# Patient Record
Sex: MALE | HISPANIC OR LATINO | URBAN - NONMETROPOLITAN AREA
[De-identification: names, ages, dates, MRNs, and addresses within clinical notes are randomized per-mention and may not be internally consistent; named-entity substitution may affect disease eponyms.]

---

## 2018-01-18 ENCOUNTER — OFFICE VISIT CONVERTED (OUTPATIENT)
Dept: FAMILY MEDICINE CLINIC | Age: 23
End: 2018-01-18
Attending: NURSE PRACTITIONER

## 2018-04-26 ENCOUNTER — OFFICE VISIT CONVERTED (OUTPATIENT)
Dept: FAMILY MEDICINE CLINIC | Age: 23
End: 2018-04-26
Attending: NURSE PRACTITIONER

## 2021-05-18 NOTE — PROGRESS NOTES
João Haney 1995     Office/Outpatient Visit    Visit Date: Thu, Jan 18, 2018 11:04 am    Provider: Lillian Nielsen N.P. (Assistant: Rachna Tsai RN)    Location: [facility]        Electronically signed by Lillian Nielsen N.P. on  01/18/2018 02:30:54 PM                             SUBJECTIVE:        CC:     Mr. Haney is a 22 year old White male.  This is his first visit to the clinic.  PE PT IS NOT TAKING PROZAC         HPI:         High risk sexual behavior: Pt states his girlfriend cheated on him. She told him she was pos for trich and was treated. Pt would like to be tested for all STD's. He is no longer with this woman.          Conjunctivitis: Pt states L eye redness since last night. He used an rx allergy drop without much relief. He is a  but wears a shield. He does recall getting a piece of metal in his eye but he is sure he got it out.          With regard to the health checkup, he cannot recall when he last had a physical exam.  He does not perform regular testicular self-exams.      Smoking Status:  Nonsmoker         In regard to the low back pain, other details: Pt states back injury x 1 month ago at work. He was given muscle relaxers which helped. States recently in the last few days he has had a flare.  Pain starts at his low back, L side and radiates down his L leg..          Dysthymic disorder details; pt states emotional changes. He does drink alcohol to help deal with his issues at night. States not thinking that is much of a problem. He has been dealing with relationship issus with his girlfriend and his reason for coming in today. States taking zoloft in the past and did not like it.      ROS:     CONSTITUTIONAL:  Negative for chills, fatigue, fever and weight change.      CARDIOVASCULAR:  Negative for chest pain, orthopnea, paroxysmal nocturnal dyspnea and pedal edema.      RESPIRATORY:  Negative for dyspnea and cough.      GASTROINTESTINAL:  Negative for  abdominal pain, heartburn, constipation, diarrhea, and stool changes.      MUSCULOSKELETAL:  Negative for arthralgias, back pain, and myalgias.      NEUROLOGICAL:  Negative for dizziness, headaches, paresthesias, and weakness.      PSYCHIATRIC:  Positive for depression and sadness.   Negative for anxiety or suicidal thoughts.          PMH/FMH/SH:     Last Reviewed on 1/18/2018 11:57 AM by Lillian Nielsen    Past Medical History: FRACTURE RIGHT FINGERS (HIT WALL) 7-15-11  SEEING ORTHOPEDIST IN [place]     UNREMARKABLE         Surgical History:     NONE     Appendectomy: 2014; uncomplicated;         Family History:     Father: Hypertension     Mother: Breast Cancer     Brother(s): Healthy; 3 brother(s) total     Sister(s): Healthy; 1 sister(s) total     Son(s): 0 son(s) total     Daughter(s): 0 daughter(s) total     Paternal Grandfather: Type 2 Diabetes     Paternal Grandmother: Type 2 Diabetes     Maternal Grandfather: Healthy     Maternal Grandmother: Healthy         Social History:         Household:  Lives with his father.      No exposure to tobacco smoke.  Occupation: [employer];     Marital Status: Single     Children: None     Hobbies/Recreation: he enjoys none;     Mr. Haney denies any current form of exercise.          Tobacco/Alcohol/Supplements:     Last Reviewed on 1/18/2018 11:57 AM by Lillian Nielsen    Tobacco: He has never smoked.          Alcohol: Frequency: Daily When he drinks, the average quantity of alcohol is.   He typically consumes beer and hard liquor.          Substance Abuse History:     Last Reviewed on 1/18/2018 11:57 AM by Lillian Nielsen        Mental Health History:     Last Reviewed on 1/18/2018 11:57 AM by Lillian Nielsen        Communicable Diseases (eg STDs):     Last Reviewed on 1/18/2018 11:57 AM by Lillian Nielsen            Current Problems:     Last Reviewed on 1/18/2018 11:57 AM by Lillian Nielsen    High risk sexual behavior     Moderate depression      Anxiety, generalized     Esophageal reflux     Family disruption due to divorce or legal separation     Acute conjunctivitis, NOS         Immunizations:     DTaP 1996     DTaP 2/7/1997     DTaP 4/10/1997     DTaP 8/20/2002     Hib HbOC (4-dose) 1996     Hib HbOC (4-dose) 2/7/1997     Hib HbOC (4-dose) 4/10/1997     Hep B (pedi/adol, 3-dose schedule) 1996     Hep B (pedi/adol, 3-dose schedule) 1996     Hep B (pedi/adol, 3-dose schedule) 9/10/1997     Menveo 8/3/2011     IPV  Poliovirus, inactivated 1996     IPV  Poliovirus, inactivated 2/7/1997     IPV  Poliovirus, inactivated 10/8/1997     IPV  Poliovirus, inactivated 8/20/2002     MMR  (Measles-Mumps-Rubella), live 10/8/1997     MMR  (Measles-Mumps-Rubella), live 8/20/2002     Varicella, live 8/20/2002     Varicella, live 8/3/2011     Adacel (Tdap) 8/3/2011         Allergies:     Last Reviewed on 1/18/2018 11:57 AM by Lillian Nielsen      No Known Drug Allergies.         Current Medications:     Last Reviewed on 1/18/2018 11:57 AM by Lillian Nielsen    None        OBJECTIVE:        Vitals:         Current: 1/18/2018 11:07:52 AM    Ht:  5 ft, 10 in;  Wt: 226.8 lbs;  BMI: 32.5    T: 98.1 F (oral);  BP: 129/85 mm Hg (left arm, sitting);  P: 72 bpm (left arm (BP Cuff), sitting);  sCr: 0.9 mg/dL;  GFR: 137.46        Exams:     PHYSICAL EXAM:     GENERAL: Vitals recorded well developed, well nourished;  well groomed;  no apparent distress;     EYES: lids and lacrimal system are normal in appearance; extraocular movements intact; Bilateral erythema;  PERRLA;     NECK:  supple, full ROM; no thyromegaly; no carotid bruits;     RESPIRATORY: normal respiratory rate and pattern with no distress; normal breath sounds with no rales, rhonchi, wheezes or rubs;     CARDIOVASCULAR: normal rate; rhythm is regular;  normal S1; normal S2; no systolic murmur; no cyanosis; no edema;     GASTROINTESTINAL: nontender, nondistended; no hepatosplenomegaly or  masses; no bruits;     SKIN:  no significant rashes or lesions; no suspicious moles;     MUSCULOSKELETAL: Low back tenderness, SLR neg bilaterally;     NEUROLOGICAL:  cranial nerves, motor and sensory function, reflexes, gait and coordination are all intact;     PSYCHIATRIC:  appropriate affect and demeanor; normal speech pattern; grossly normal memory;         ASSESSMENT:           V69.2   Z72.51  High risk sexual behavior              DDx:     372.00   H10.33  Acute conjunctivitis, NOS              DDx:     V70.0   Z00.00  Health checkup              DDx:     724.2   M54.5  Low back pain              DDx:     300.4   F34.89  Dysthymic disorder              DDx:     V79.0   Z13.89  Screening for depression              DDx:         ORDERS:         Meds Prescribed:       Polytrim (Polymyxin B/Trimethoprim ) Ophthalmic Solution 1-2 drops to affected eye every 3 hours x 7 days.  #1 (One) bottle Refills: 0       Cyclobenzaprine HCl 10mg Tablet 1 tablet AT HS PRN  #20 (Twenty) tablet(s) Refills: 0       Medrol (Methylprednisolone) 4mg Dosepak Take as directed with food  #1 (One) dose pack Refills: 0       Lexapro (Escitalopram Oxalate) 10mg Tablet 1 tab daily  #30 (Thirty) tablet(s) Refills: 2         Lab Orders:       [id number]  AHEP4 - [facility] Hepatitis Panel (HAAb, HbcAB, HbsAG, Hep C antibody)  (Send-Out)         88728  HIV - [facility] HIV-1 and HIV-2 Ab  (Send-Out)         33684  RPR - H RPR, Rfx Qn RPR/Confirm TP  (Send-Out)         74776  CTNGX- [facility] Chlamydia GC swab or urine (23758, 12239)  (Send-Out)         36613  HSVIG -[facility]- Herpes simplex IgG1&@ 15623 and 43810  (Send-Out)         38841  BDUTR - [facility] Urine Trichonomas  (Send-Out)         97927  BDUAM - [facility] Urinalysis, automated, with micro  (Send-Out)           Other Orders:       [id number]  Behav assmt w/score & docd/stand instrument  (In-House)                   PLAN:          High risk sexual behavior     LABORATORY:  Labs ordered to be performed today include STD  Testing: Hepatitis panel HIV I and HIV 2 Ab RPR Chlamydia/GC [facility] Herpes type I and II IgG index value on each type and urinalysis with micro.  School/Work Excuse for Today           Orders:       [id number]  AHEP4 - [facility] Hepatitis Panel (HAAb, HbcAB, HbsAG, Hep C antibody)  (Send-Out)         29780  HIV - [facility] HIV-1 and HIV-2 Ab  (Send-Out)         53262  RPR - H RPR, Rfx Qn RPR/Confirm TP  (Send-Out)         97593  CTNGX- [facility] Chlamydia GC swab or urine (81182, 37796)  (Send-Out)         81150  HSVIG -[facility]- Herpes simplex IgG1&@ 91265 and 63620  (Send-Out)         98150  BDUTR - [facility] Urine Trichonomas  (Send-Out)         69681  BDUAM - [facility] Urinalysis, automated, with micro  (Send-Out)            Acute conjunctivitis, NOS Pt agrees to see an optometrist tomorrow if no improvement.         FOLLOW-UP: Advised to call if there is no improvement..   Chronic visit follow up           Prescriptions:       Polytrim (Polymyxin B/Trimethoprim ) Ophthalmic Solution 1-2 drops to affected eye every 3 hours x 7 days.  #1 (One) bottle Refills: 0          Low back pain           Prescriptions:       Cyclobenzaprine HCl 10mg Tablet 1 tablet AT HS PRN  #20 (Twenty) tablet(s) Refills: 0       Medrol (Methylprednisolone) 4mg Dosepak Take as directed with food  #1 (One) dose pack Refills: 0          Dysthymic disorder         FOLLOW-UP: Schedule a follow-up visit in 1 month..   Chronic visit follow up           Prescriptions:       Lexapro (Escitalopram Oxalate) 10mg Tablet 1 tab daily  #30 (Thirty) tablet(s) Refills: 2          Screening for depression     MIPS PHQ-9 Depression Screening: Completed form scanned and in chart; Total Score 5           Orders:       [id number]  Behav assmt w/score & docd/stand instrument  (In-House)               Patient Recommendations:        For  Acute conjunctivitis, NOS:                     APPOINTMENT INFORMATION:        Monday Tuesday Wednesday Thursday Friday Saturday Sunday             Time:___________________AM  PM   Date:_____________________         For  Dysthymic disorder:     Schedule a follow-up visit in 1 month.                APPOINTMENT INFORMATION:        Monday Tuesday Wednesday Thursday Friday Saturday Sunday            Time:___________________AM  PM   Date:_____________________             CHARGE CAPTURE:           Primary Diagnosis:     V69.2 High risk sexual behavior            Z72.51    High risk heterosexual behavior              Orders:          [id number] -25  Office/outpatient visit; established patient, level 3  (In-House)             91805   Preventive medicine, new patient, age 18-39 years  (In-House)           372.00 Acute conjunctivitis, NOS            H10.33    Unspecified acute conjunctivitis, bilateral    V70.0 Health checkup            Z00.00    Encounter for general adult medical examination without abnormal findings    724.2 Low back pain            M54.5    Low back pain    300.4 Dysthymic disorder            F34.89    Other specified persistent mood disorders    V79.0 Screening for depression            Z13.89    Encounter for screening for other disorder              Orders:          14407   Behav assmt w/score & docd/stand instrument  (In-House)

## 2021-05-18 NOTE — PROGRESS NOTES
João HaneyJacek 1995     Office/Outpatient Visit    Visit Date: Thu, Apr 26, 2018 03:14 pm    Provider: Lillian Nielsen N.P. (Assistant: Shirley Nagel MA)    Location: [facility]        Electronically signed by Lillian Nielsen N.P. on  04/26/2018 05:47:38 PM                             SUBJECTIVE:        CC:     Mr. Haney is a 22 year old White male.  Patient complains of pain in his back, he lifts heavy things for a living.          HPI:         Patient complains of back pain.  Other details: Pt states he was lifting things he thinks were too heavy. It was while he was at work last friday. States the next day is when his pain started. Pt states pain to R low back and pain to L groin. Has taken tylenol with some relief..      ROS:     CONSTITUTIONAL:  Negative for chills, fatigue, fever and weight change.      CARDIOVASCULAR:  Negative for chest pain, orthopnea, paroxysmal nocturnal dyspnea and pedal edema.      RESPIRATORY:  Negative for dyspnea and cough.      GASTROINTESTINAL:  Negative for abdominal pain, heartburn, constipation, diarrhea, and stool changes.      MUSCULOSKELETAL:  Positive for back pain.      NEUROLOGICAL:  Negative for dizziness, headaches, paresthesias, and weakness.      PSYCHIATRIC:  Negative for anxiety and depression.          PM/FMH/SH:     Last Reviewed on 1/18/2018 11:57 AM by Lillian Nielsen    Past Medical History: FRACTURE RIGHT FINGERS (HIT WALL) 7-15-11  SEEING ORTHOPEDIST IN [place]     UNREMARKABLE         Surgical History:     NONE     Appendectomy: 2014; uncomplicated;         Family History:     Father: Hypertension     Mother: Breast Cancer     Brother(s): Healthy; 3 brother(s) total     Sister(s): Healthy; 1 sister(s) total     Son(s): 0 son(s) total     Daughter(s): 0 daughter(s) total     Paternal Grandfather: Type 2 Diabetes     Paternal Grandmother: Type 2 Diabetes     Maternal Grandfather: Healthy     Maternal Grandmother: Healthy          Social History:         Household:  Lives with his father.      No exposure to tobacco smoke.  Occupation: [employer];     Marital Status: Single     Children: None     Hobbies/Recreation: he enjoys none;     Mr. Haney denies any current form of exercise.          Tobacco/Alcohol/Supplements:     Last Reviewed on 1/18/2018 11:57 AM by Lillian Nielsen    Tobacco: He has never smoked.          Alcohol: Frequency: Daily When he drinks, the average quantity of alcohol is.   He typically consumes beer and hard liquor.          Substance Abuse History:     Last Reviewed on 1/18/2018 11:57 AM by Lillian Nielsen        Mental Health History:     Last Reviewed on 1/18/2018 11:57 AM by Lillian Nielsen        Communicable Diseases (eg STDs):     Last Reviewed on 1/18/2018 11:57 AM by Lillian Nielsen            Current Problems:     Last Reviewed on 1/18/2018 11:57 AM by Lillian Nielsen    Dysthymic disorder     Low back pain     High risk sexual behavior     Moderate depression     Anxiety, generalized     Esophageal reflux     Family disruption due to divorce or legal separation         Immunizations:     DTaP 1996     DTaP 2/7/1997     DTaP 4/10/1997     DTaP 8/20/2002     Hib HbOC (4-dose) 1996     Hib HbOC (4-dose) 2/7/1997     Hib HbOC (4-dose) 4/10/1997     Hep B (pedi/adol, 3-dose schedule) 1996     Hep B (pedi/adol, 3-dose schedule) 1996     Hep B (pedi/adol, 3-dose schedule) 9/10/1997     Menveo 8/3/2011     IPV  Poliovirus, inactivated 1996     IPV  Poliovirus, inactivated 2/7/1997     IPV  Poliovirus, inactivated 10/8/1997     IPV  Poliovirus, inactivated 8/20/2002     MMR  (Measles-Mumps-Rubella), live 10/8/1997     MMR  (Measles-Mumps-Rubella), live 8/20/2002     Varicella, live 8/20/2002     Varicella, live 8/3/2011     Adacel (Tdap) 8/3/2011         Allergies:     Last Reviewed on 4/26/2018 03:17 PM by Shirley Nagel      No Known Drug Allergies.         Current  Medications:     Last Reviewed on 4/26/2018 03:18 PM by Shirley Nagel        OBJECTIVE:        Vitals:         Current: 4/26/2018 3:17:23 PM    Ht:  5 ft, 10 in;  Wt: 233.7 lbs;  BMI: 33.5    T: 99.4 F (oral);  BP: 142/74 mm Hg (left arm, sitting);  P: 81 bpm (left arm (BP Cuff), sitting);  sCr: 0.9 mg/dL;  GFR: 139.22        Exams:     PHYSICAL EXAM:     GENERAL: Vitals recorded well developed, well nourished;  well groomed;  no apparent distress;     EYES: lids and lacrimal system are normal in appearance; extraocular movements intact; conjunctiva and cornea are normal; PERRLA;     NECK:  supple, full ROM; no thyromegaly; no carotid bruits;     RESPIRATORY: normal respiratory rate and pattern with no distress; normal breath sounds with no rales, rhonchi, wheezes or rubs;     CARDIOVASCULAR: normal rate; rhythm is regular;  normal S1; normal S2; no systolic murmur; no cyanosis; no edema;     GASTROINTESTINAL: nontender, nondistended; no hepatosplenomegaly or masses; no bruits;     SKIN:  no significant rashes or lesions; no suspicious moles;     MUSCULOSKELETAL: Low back tenderness, SLR neg bilaterally.;     NEUROLOGICAL:  cranial nerves, motor and sensory function, reflexes, gait and coordination are all intact;     PSYCHIATRIC:  appropriate affect and demeanor; normal speech pattern; grossly normal memory;         ASSESSMENT:           724.5   M54.5  Back pain              DDx:         ORDERS:         Meds Prescribed:       Medrol (Methylprednisolone) 4mg Dosepak Take as directed with food  #1 (One) dose pack Refills: 0       Robaxin (Methocarbamol) 500mg Tablet 1 tab HS PRN  #20 (Twenty) tablet(s) Refills: 1         Radiology/Test Orders:       [id number]  Radiologic examination, spine, lumbosacral;  minimum of four views  (Send-Out)           Procedures Ordered:       REFER  Referral to Specialist or Other Facility  (Send-Out)                   PLAN:          Back pain         RADIOLOGY:  I have ordered  Lumbar/Sacral Spine X-ray to be done today.      REFERRALS:  Referral initiated to physical therapy ( [facility] ).  School/Work Excuse for Today Tomorrow           Prescriptions:       Medrol (Methylprednisolone) 4mg Dosepak Take as directed with food  #1 (One) dose pack Refills: 0       Robaxin (Methocarbamol) 500mg Tablet 1 tab HS PRN  #20 (Twenty) tablet(s) Refills: 1           Orders:       [id number]  Radiologic examination, spine, lumbosacral;  minimum of four views  (Send-Out)         REFER  Referral to Specialist or Other Facility  (Send-Out)             Patient Education Handouts:       Acute Low Back Pain              CHARGE CAPTURE:           Primary Diagnosis:     724.5 Back pain            M54.5    Low back pain              Orders:          [id number]   Office/outpatient visit; established patient, level 3  (In-House)

## 2021-07-01 VITALS
HEART RATE: 72 BPM | WEIGHT: 226.8 LBS | HEIGHT: 70 IN | BODY MASS INDEX: 32.47 KG/M2 | SYSTOLIC BLOOD PRESSURE: 129 MMHG | TEMPERATURE: 98.1 F | DIASTOLIC BLOOD PRESSURE: 85 MMHG

## 2021-07-01 VITALS
WEIGHT: 233.7 LBS | HEIGHT: 70 IN | DIASTOLIC BLOOD PRESSURE: 74 MMHG | HEART RATE: 81 BPM | BODY MASS INDEX: 33.46 KG/M2 | TEMPERATURE: 99.4 F | SYSTOLIC BLOOD PRESSURE: 142 MMHG